# Patient Record
Sex: FEMALE | Race: ASIAN | NOT HISPANIC OR LATINO | URBAN - METROPOLITAN AREA
[De-identification: names, ages, dates, MRNs, and addresses within clinical notes are randomized per-mention and may not be internally consistent; named-entity substitution may affect disease eponyms.]

---

## 2017-03-06 ENCOUNTER — ALLSCRIPTS OFFICE VISIT (OUTPATIENT)
Dept: OTHER | Facility: OTHER | Age: 37
End: 2017-03-06

## 2017-03-06 DIAGNOSIS — Z13.220 ENCOUNTER FOR SCREENING FOR LIPOID DISORDERS: ICD-10-CM

## 2017-03-06 DIAGNOSIS — Z13.1 ENCOUNTER FOR SCREENING FOR DIABETES MELLITUS: ICD-10-CM

## 2017-03-06 DIAGNOSIS — Z13.6 ENCOUNTER FOR SCREENING FOR CARDIOVASCULAR DISORDERS: ICD-10-CM

## 2017-03-06 DIAGNOSIS — Z00.00 ENCOUNTER FOR GENERAL ADULT MEDICAL EXAMINATION WITHOUT ABNORMAL FINDINGS: ICD-10-CM

## 2017-03-07 ENCOUNTER — LAB CONVERSION - ENCOUNTER (OUTPATIENT)
Dept: OTHER | Facility: OTHER | Age: 37
End: 2017-03-07

## 2017-03-07 LAB
A/G RATIO (HISTORICAL): 1.6 (CALC) (ref 1–2.5)
ALBUMIN SERPL BCP-MCNC: 4.4 G/DL (ref 3.6–5.1)
ALP SERPL-CCNC: 40 U/L (ref 33–115)
ALT SERPL W P-5'-P-CCNC: 8 U/L (ref 6–29)
AST SERPL W P-5'-P-CCNC: 15 U/L (ref 10–30)
BILIRUB SERPL-MCNC: 0.8 MG/DL (ref 0.2–1.2)
BUN SERPL-MCNC: 13 MG/DL (ref 7–25)
BUN/CREA RATIO (HISTORICAL): NORMAL (CALC) (ref 6–22)
CALCIUM SERPL-MCNC: 9.5 MG/DL (ref 8.6–10.2)
CHLORIDE SERPL-SCNC: 105 MMOL/L (ref 98–110)
CHOLEST SERPL-MCNC: 224 MG/DL (ref 125–200)
CHOLEST/HDLC SERPL: 4 (CALC)
CO2 SERPL-SCNC: 26 MMOL/L (ref 20–31)
CREAT SERPL-MCNC: 0.7 MG/DL (ref 0.5–1.1)
EGFR AFRICAN AMERICAN (HISTORICAL): 127 ML/MIN/1.73M2
EGFR-AMERICAN CALC (HISTORICAL): 110 ML/MIN/1.73M2
GAMMA GLOBULIN (HISTORICAL): 2.7 G/DL (CALC) (ref 1.9–3.7)
GLUCOSE (HISTORICAL): 85 MG/DL (ref 65–99)
HDLC SERPL-MCNC: 56 MG/DL
LDL CHOLESTEROL (HISTORICAL): 153 MG/DL (CALC)
NON-HDL-CHOL (CHOL-HDL) (HISTORICAL): 168 MG/DL (CALC)
POTASSIUM SERPL-SCNC: 4.4 MMOL/L (ref 3.5–5.3)
SODIUM SERPL-SCNC: 138 MMOL/L (ref 135–146)
TOTAL PROTEIN (HISTORICAL): 7.1 G/DL (ref 6.1–8.1)
TRIGL SERPL-MCNC: 75 MG/DL

## 2017-03-08 ENCOUNTER — GENERIC CONVERSION - ENCOUNTER (OUTPATIENT)
Dept: OTHER | Facility: OTHER | Age: 37
End: 2017-03-08

## 2017-03-23 ENCOUNTER — GENERIC CONVERSION - ENCOUNTER (OUTPATIENT)
Dept: OTHER | Facility: OTHER | Age: 37
End: 2017-03-23

## 2017-07-31 ENCOUNTER — ALLSCRIPTS OFFICE VISIT (OUTPATIENT)
Dept: OTHER | Facility: OTHER | Age: 37
End: 2017-07-31

## 2017-07-31 DIAGNOSIS — Z12.4 ENCOUNTER FOR SCREENING FOR MALIGNANT NEOPLASM OF CERVIX: ICD-10-CM

## 2017-07-31 PROCEDURE — 88175 CYTOPATH C/V AUTO FLUID REDO: CPT | Performed by: FAMILY MEDICINE

## 2017-07-31 PROCEDURE — 87624 HPV HI-RISK TYP POOLED RSLT: CPT | Performed by: FAMILY MEDICINE

## 2017-08-01 ENCOUNTER — LAB REQUISITION (OUTPATIENT)
Dept: LAB | Facility: HOSPITAL | Age: 37
End: 2017-08-01
Payer: COMMERCIAL

## 2017-08-01 DIAGNOSIS — Z12.4 ENCOUNTER FOR SCREENING FOR MALIGNANT NEOPLASM OF CERVIX: ICD-10-CM

## 2017-08-03 LAB — HPV RRNA GENITAL QL NAA+PROBE: NORMAL

## 2017-08-05 LAB
LAB AP GYN PRIMARY INTERPRETATION: NORMAL
LAB AP LMP: NORMAL
Lab: NORMAL

## 2017-08-08 ENCOUNTER — GENERIC CONVERSION - ENCOUNTER (OUTPATIENT)
Dept: OTHER | Facility: OTHER | Age: 37
End: 2017-08-08

## 2018-01-09 NOTE — PROGRESS NOTES
Assessment    1  Encounter for preventive health examination (V70 0) (Z00 00)   2  Encounter for Papanicolaou smear for cervical cancer screening (V76 2) (Z12 4)    Plan  Encounter for Papanicolaou smear for cervical cancer screening    · (1) THIN PREP PAP FOLLOW UP WITH IMAGING; Status:Active; Requested  for:20Obq4125; Maturation index required? : No  : 7/8/2017  HPV? : Regardless of Interpretation   · Always use a seat belt and shoulder strap when riding or driving a motor vehicle ;  Status:Complete;   Done: 30VVG8323   · Begin a limited exercise program ; Status:Complete;   Done: 92NIE5391   · Brush your teeth 3 times a day and floss at least once a day ; Status:Complete;   Done:  87SDH5973   · Eat a normal well-balanced diet ; Status:Complete;   Done: 94QPU5306   · Use a sun block product with an SPF of 15 or more ; Status:Complete;   Done: 76OHR7906   · We recommend routine visits to a dentist ; Status:Complete;   Done: 58ASW2495    Discussion/Summary  health maintenance visit Currently, she eats a healthy diet  HPV and Pap Co-testing Done Today Breast cancer screening: breast cancer screening is not indicated  Colorectal cancer screening: colorectal cancer screening is not indicated  Osteoporosis screening: bone mineral density testing is not indicated  1) HM - Pap done today  Reviewed screening lab results form lab visit with patient  Encouraged patient to see a dentist   F/U yearly  Self Referrals: No      History of Present Illness  HM, Adult Female: The patient is being seen for a health maintenance evaluation  The last health maintenance visit was year(s) ago, 3  General Health: The patient's health since the last visit is described as good  She does not have regular dental visits  She denies vision problems  She denies hearing loss  Lifestyle:  She consumes a diverse and healthy diet  She does not have any weight concerns  She exercises regularly   She does not use tobacco  She denies alcohol use  She denies drug use  Reproductive health: the patient is premenopausal    Screening: cancer screening reviewed and updated  metabolic screening reviewed and current  HPI: LMP 2017    No problem with sexual activity  Has not seen dentist      Review of Systems    Constitutional: No fever, no chills, feels well, no tiredness, no recent weight gain or weight loss  Eyes: No complaints of eye pain, no red eyes, no eyesight problems, no discharge, no dry eyes, no itching of eyes  ENT: no complaints of earache, no loss of hearing, no nose bleeds, no nasal discharge, no sore throat, no hoarseness  Cardiovascular: No complaints of slow heart rate, no fast heart rate, no chest pain, no palpitations, no leg claudication, no lower extremity edema  Respiratory: No complaints of shortness of breath, no wheezing, no cough, no SOB on exertion, no orthopnea, no PND  Gastrointestinal: No complaints of abdominal pain, no constipation, no nausea or vomiting, no diarrhea, no bloody stools  Musculoskeletal: No complaints of arthralgias, no myalgias, no joint swelling or stiffness, no limb pain or swelling  Integumentary: No complaints of skin rash or lesions, no itching, no skin wounds, no breast pain or lump  Neurological: No complaints of headache, no confusion, no convulsions, no numbness, no dizziness or fainting, no tingling, no limb weakness, no difficulty walking  Psychiatric: Not suicidal, no sleep disturbance, no anxiety or depression, no change in personality, no emotional problems  Endocrine: No complaints of proptosis, no hot flashes, no muscle weakness, no deepening of the voice, no feelings of weakness  Hematologic/Lymphatic: No complaints of swollen glands, no swollen glands in the neck, does not bleed easily, does not bruise easily  Active Problems    1  Allergic conjunctivitis of both eyes (372 14) (H10 13)   2  Chronic sinusitis (473 9) (J32 9)   3   Encounter for screening for cardiovascular disorders (V81 2) (Z13 6)   4  Screening for diabetes mellitus (DM) (V77 1) (Z13 1)   5  Screening for lipoid disorders (V77 91) (Z13 220)    Family History  Father    · Family history of hypertension (V17 49) (Z82 49)    Social History    · Never a smoker   · No alcohol use    Current Meds   1  No Reported Medications Recorded    Allergies    1  No Known Drug Allergies    Vitals   Recorded: 45DGZ4025 11:00AM   Temperature 96 7 F   Heart Rate 72   Respiration 16   Systolic 100   Diastolic 70   Weight 614 lb 8 oz   BMI Calculated 26 35   BSA Calculated 1 75     Physical Exam    Constitutional   General appearance: No acute distress, well appearing and well nourished  Head and Face   Head and face: Normal     Eyes   Conjunctiva and lids: No swelling, erythema or discharge  Ears, Nose, Mouth, and Throat   External inspection of ears and nose: Normal     Otoscopic examination: Tympanic membranes translucent with normal light reflex  Canals patent without erythema  Nasal mucosa, septum, and turbinates: Normal without edema or erythema  Lips, teeth, and gums: Normal, good dentition  Neck   Neck: Supple, symmetric, trachea midline, no masses  Thyroid: Normal, no thyromegaly  Pulmonary   Respiratory effort: No increased work of breathing or signs of respiratory distress  Auscultation of lungs: Clear to auscultation  Cardiovascular   Auscultation of heart: Normal rate and rhythm, normal S1 and S2, no murmurs  Examination of extremities for edema and/or varicosities: Normal     Abdomen   Abdomen: Non-tender, no masses  Liver and spleen: No hepatomegaly or splenomegaly  Genitourinary   External genitalia and vagina: Normal, no lesions appreciated  Urethra: Normal, no discharge  Bladder: Not distended, no tenderness  Cervix: Normal, no lesions, Pap obtained  Uterus: Normal size, no tenderness, no masses  transformation zone seen     Adnexa/Parametria: Normal, no masses or tenderness  Lymphatic   Palpation of lymph nodes in neck: No lymphadenopathy  Palpation of lymph nodes in axillae: No lymphadenopathy  Musculoskeletal   Gait and station: Normal     Digits and nails: Normal without clubbing or cyanosis  Joints, bones, and muscles: Normal     Range of motion: Normal     Stability: Normal     Muscle strength/tone: Normal     Skin   Skin and subcutaneous tissue: Normal without rashes or lesions  Palpation of skin and subcutaneous tissue: Normal turgor  Psychiatric   Judgment and insight: Normal     Orientation to person, place, and time: Normal     Recent and remote memory: Intact      Mood and affect: Normal        Signatures   Electronically signed by : KIRTI Leyva Cea ; Aug  1 2017 10:36AM EST                       (Author)

## 2018-01-14 VITALS
HEART RATE: 72 BPM | WEIGHT: 154 LBS | SYSTOLIC BLOOD PRESSURE: 122 MMHG | DIASTOLIC BLOOD PRESSURE: 78 MMHG | TEMPERATURE: 97.8 F | BODY MASS INDEX: 26.29 KG/M2 | RESPIRATION RATE: 16 BRPM | HEIGHT: 64 IN

## 2018-01-14 VITALS
SYSTOLIC BLOOD PRESSURE: 110 MMHG | DIASTOLIC BLOOD PRESSURE: 70 MMHG | HEART RATE: 72 BPM | TEMPERATURE: 96.7 F | WEIGHT: 153.5 LBS | RESPIRATION RATE: 16 BRPM

## 2018-01-16 NOTE — RESULT NOTES
Message  Lamar Lopez tovastan ortizam kiarra janice mirella em [facility] Jim[facility] em can pritchett pap smear la nam 2022  Your pap smear result is normal  Next pap is due in 2022      Dr Koko Torres   Electronically signed by : KIRTI Wilburn ; Aug  8 2017  9:02AM EST                       (Author)

## 2018-01-16 NOTE — RESULT NOTES
Message  Lamar Padillastan villalpandou wesly mirella em dawit doi tot  Chi co chat mo (cholesterol) wei joe can [employer]  Em rang kieng an nhieu chat mo va tap the mike sal ngay  Your lab work is pretty good  The cholesterol is slightly high but no need for medication yet  Please try to stay on low fat diet and maintain daily exercise      Dr Samantha Morales   Electronically signed by : Elwyn Denver, M D ; Mar  8 2017  9:37AM EST                       (Author)

## 2021-03-04 ENCOUNTER — OFFICE VISIT (OUTPATIENT)
Dept: FAMILY MEDICINE CLINIC | Facility: CLINIC | Age: 41
End: 2021-03-04
Payer: COMMERCIAL

## 2021-03-04 VITALS
DIASTOLIC BLOOD PRESSURE: 64 MMHG | HEART RATE: 71 BPM | BODY MASS INDEX: 27.46 KG/M2 | WEIGHT: 164.8 LBS | OXYGEN SATURATION: 99 % | HEIGHT: 65 IN | TEMPERATURE: 95.4 F | SYSTOLIC BLOOD PRESSURE: 102 MMHG

## 2021-03-04 DIAGNOSIS — E78.5 DYSLIPIDEMIA: ICD-10-CM

## 2021-03-04 DIAGNOSIS — Z11.4 SCREENING FOR HIV (HUMAN IMMUNODEFICIENCY VIRUS): ICD-10-CM

## 2021-03-04 DIAGNOSIS — E53.8 FOLIC ACID DEFICIENCY: ICD-10-CM

## 2021-03-04 DIAGNOSIS — H57.13 PAIN OF BOTH EYES: ICD-10-CM

## 2021-03-04 DIAGNOSIS — Z28.39 IMMUNIZATION DEFICIENCY: ICD-10-CM

## 2021-03-04 DIAGNOSIS — Z00.00 ANNUAL PHYSICAL EXAM: ICD-10-CM

## 2021-03-04 DIAGNOSIS — E54 ASCORBIC ACID DEFICIENCY: ICD-10-CM

## 2021-03-04 DIAGNOSIS — R63.8 INCREASED BMI: ICD-10-CM

## 2021-03-04 DIAGNOSIS — E55.9 VITAMIN D DEFICIENCY: ICD-10-CM

## 2021-03-04 DIAGNOSIS — K92.1 BLOOD IN STOOL: ICD-10-CM

## 2021-03-04 DIAGNOSIS — Z12.31 ENCOUNTER FOR SCREENING MAMMOGRAM FOR BREAST CANCER: Primary | ICD-10-CM

## 2021-03-04 DIAGNOSIS — E53.8 CYANOCOBALAMIN DEFICIENCY: ICD-10-CM

## 2021-03-04 DIAGNOSIS — G43.B1 INTRACTABLE OPHTHALMOPLEGIC MIGRAINE: ICD-10-CM

## 2021-03-04 PROCEDURE — 99396 PREV VISIT EST AGE 40-64: CPT | Performed by: FAMILY MEDICINE

## 2021-03-04 RX ORDER — POLYETHYLENE GLYCOL 3350 17 G/17G
17 POWDER, FOR SOLUTION ORAL 2 TIMES DAILY
Qty: 507 G | Refills: 2 | Status: SHIPPED | OUTPATIENT
Start: 2021-03-04

## 2021-03-04 NOTE — PROGRESS NOTES
ADULT ANNUAL PHYSICAL  [address] [facility]    NAME: Kari Aragon  AGE: 41 y o  SEX: female  : 1980     DATE: 3/4/2021     Assessment and Plan:     Order for mammogram   Pap smear will be done next year  She is not sexually active  Offer flu vaccine patient refused  Regarding headaches will refer patient to neurologist   She try cervical injection that did not help  She try topiramate it did not help either  She had ocular migraine  Refer patient to Ophthalmology for evaluation for eyes pain  Will need blood work  Tdap was done  she will need in 2 years  Will check blood work  Will follow-up cholesterol since it was high in the past   She complained of blood in the stool due to constipation will try stool softener if is not improved will recommend colonoscopy        Problem List Items Addressed This Visit        Cardiovascular and Mediastinum    Intractable ophthalmoplegic migraine    Relevant Orders    Ambulatory referral to Neurology       Other    Pain of both eyes    Relevant Orders    Ambulatory referral to Ophthalmology    Blood in stool    Relevant Medications    polyethylene glycol (GLYCOLAX) 17 GM/SCOOP powder    Other Relevant Orders    CBC and differential    UA w Reflex to Microscopic w Reflex to Culture    Occult Blood, Fecal Immunochemical    Ambulatory referral to General Surgery    Folic acid deficiency    Dyslipidemia    Relevant Orders    Comprehensive metabolic panel    Lipid Panel with Direct LDL reflex    TSH, 3rd generation with Free T4 reflex      Other Visit Diagnoses     Encounter for screening mammogram for breast cancer    -  Primary    Relevant Orders    Mammo screening bilateral w 3d & cad    Screening for HIV (human immunodeficiency virus)        Relevant Orders    HIV 1/2 Antigen/Antibody (4th Generation) w Reflex [facility]    Increased BMI        Vitamin D deficiency        Relevant Orders    Vitamin D 25 hydroxy Cyanocobalamin deficiency        Relevant Orders    Vitamin B12    Immunization deficiency        Relevant Orders    Hepatitis A antibody, total    Ascorbic acid deficiency        Relevant Orders    Vitamin C    Annual physical exam              Immunizations and preventive care screenings were discussed with patient today  Appropriate education was printed on patient's after visit summary  Counseling:  · Exercise: the importance of regular exercise/physical activity was discussed  Recommend exercise 3-5 times per week for at least 30 minutes  BMI Counseling: Body mass index is 27 42 kg/m²  The BMI is above normal  Nutrition recommendations include decreasing portion sizes, encouraging healthy choices of fruits and vegetables, limiting drinks that contain sugar and reducing intake of cholesterol  Exercise recommendations include exercising 3-5 times per week  No pharmacotherapy was ordered  Return in about 3 months (around 6/4/2021) for 30 min f/u blood test/blood in stool  Chief Complaint:     Chief Complaint   Patient presents with    Follow-up      History of Present Illness:     Adult Annual Physical   Patient here for a comprehensive physical exam  The patient reports problems - headaches, eyes pain, blurry vision  Diet and Physical Activity  · Diet/Nutrition: well balanced diet  · Exercise: no formal exercise  Depression Screening  PHQ-9 Depression Screening    PHQ-9:   Frequency of the following problems over the past two weeks:      Little interest or pleasure in doing things: 0 - not at all  Feeling down, depressed, or hopeless: 0 - not at all  PHQ-2 Score: 0       General Health  · Sleep: sleeps well  · Hearing: normal - bilateral   · Vision: no vision problems and vision problems: yes eyes pain  · Dental: regular dental visits  /GYN Health  · Patient is: perimenopausal  · Last menstrual period: 3 weeks ago  · Contraceptive method: none       Review of Systems: Review of Systems   Constitutional: Negative for activity change, appetite change, fatigue and unexpected weight change  HENT: Negative for ear pain, sore throat, trouble swallowing and voice change  Eyes: Positive for photophobia, pain and visual disturbance  Respiratory: Negative for cough, chest tightness, shortness of breath and wheezing  Cardiovascular: Negative for chest pain, palpitations and leg swelling  Gastrointestinal: Negative for abdominal pain, constipation, diarrhea, nausea, rectal pain and vomiting  Endocrine: Negative for cold intolerance, polydipsia and polyuria  Genitourinary: Negative for difficulty urinating, dysuria, flank pain, menstrual problem and pelvic pain  Musculoskeletal: Negative for arthralgias, joint swelling and myalgias  Skin: Negative for color change and rash  Allergic/Immunologic: Negative for environmental allergies and immunocompromised state  Neurological: Positive for headaches  Negative for dizziness, weakness and numbness  Hematological: Negative for adenopathy  Does not bruise/bleed easily  Psychiatric/Behavioral: Negative for decreased concentration, dysphoric mood, self-injury, sleep disturbance and suicidal ideas  The patient is not nervous/anxious  Past Medical History:     Past Medical History:   Diagnosis Date    Blood in stool 3/4/2021    Dyslipidemia 6/6/5393    Folic acid deficiency 2/0/2836    Intractable ophthalmoplegic migraine 3/4/2021    Migraines     Pain of both eyes 3/4/2021      Past Surgical History:     History reviewed  No pertinent surgical history     Social History:        Social History     Socioeconomic History    Marital status: /Civil Union     Spouse name: None    Number of children: None    Years of education: 15    Highest education level: None   Occupational History    Occupation:    works at [employer] [address] [employer] [address] [place] strain: None    Food insecurity     Worry: None     Inability: None    Transportation needs     Medical: None     Non-medical: None   Tobacco Use    Smoking status: Never Smoker    Smokeless tobacco: Never Used   Substance and Sexual Activity    Alcohol use: Not Currently     Comment: Alcohol intake:   None - As per Agnes Ac Drug use: None    Sexual activity: None   Lifestyle    Physical activity     Days per week: None     Minutes per session: None    Stress: None   Relationships    Social connections     Talks on phone: None     Gets together: None     Attends Shinto service: None     Active member of club or organization: None     Attends meetings of clubs or organizations: None     Relationship status: None    Intimate partner violence     Fear of current or ex partner: None     Emotionally abused: None     Physically abused: None     Forced sexual activity: None   Other Topics Concern    None   Social History Narrative    · Most recent tobacco use screenin2019      · Do you currently or have you served in the [employer]:   No      · Were you activated, into active duty, as a member of the [employer] or as a Reservist:   No      · Marital status:       · Exercise level:   Occasional      · Diet:   Regular      · General stress level:   Low      · Caffeine intake:   Occasional      · Guns present in home:   No      · Seat belts used routinely:   Yes      · Sunscreen used routinely:   Yes     · Live alone or with others:   with others      · Smoke alarm in home: Yes      · Are there stairs in your home:    Yes      · Advance directive:   No      · International travel:   yes      · Pets:   No     As per [employer]       Family History:     Family History   Problem Relation Age of Onset    Diabetes Mother     Migraines Mother     Hypertension Father     Hyperlipidemia Father       Current Medications:     Current Outpatient Medications   Medication Sig Dispense Refill    polyethylene glycol (GLYCOLAX) 17 GM/SCOOP powder Take 17 g by mouth 2 (two) times a day 507 g 2     No current facility-administered medications for this visit  Allergies:     No Known Allergies   Physical Exam:     /64 (BP Location: Left arm, Patient Position: Sitting, Cuff Size: Standard)   Pulse 71   Temp (!) 95 4 °F (35 2 °C) (Tympanic)   Ht 5' 5" (1 651 m)   Wt 74 8 kg (164 lb 12 8 oz)   SpO2 99%   BMI 27 42 kg/m²     Physical Exam  Vitals signs and nursing note reviewed  Constitutional:       General: She is not in acute distress  Appearance: Normal appearance  She is well-developed  HENT:      Head: Normocephalic and atraumatic  Right Ear: Tympanic membrane normal       Left Ear: Tympanic membrane normal       Nose: Nose normal       Mouth/Throat:      Mouth: Mucous membranes are moist    Eyes:      Conjunctiva/sclera: Conjunctivae normal    Neck:      Musculoskeletal: Normal range of motion and neck supple  Cardiovascular:      Rate and Rhythm: Normal rate and regular rhythm  Pulses: Normal pulses  Heart sounds: No murmur  Pulmonary:      Effort: Pulmonary effort is normal  No respiratory distress  Breath sounds: Normal breath sounds  Abdominal:      Palpations: Abdomen is soft  Tenderness: There is no abdominal tenderness  Musculoskeletal: Normal range of motion  Skin:     General: Skin is warm and dry  Capillary Refill: Capillary refill takes less than 2 seconds  Neurological:      General: No focal deficit present  Mental Status: She is alert and oriented to person, place, and time  Mental status is at baseline  Psychiatric:         Mood and Affect: Mood normal          Behavior: Behavior normal          Thought Content:  Thought content normal          Judgment: Judgment normal           Armando Oh MD  [facility]

## 2021-03-04 NOTE — PATIENT INSTRUCTIONS

## 2021-03-09 ENCOUNTER — TELEPHONE (OUTPATIENT)
Dept: NEUROLOGY | Facility: CLINIC | Age: 41
End: 2021-03-09

## 2021-03-09 NOTE — TELEPHONE ENCOUNTER
Best contact number for [facility]NNMD[facility]:[phone]    Emergency Contact name and number:None     Referring provider and telephone number:Armando Oh    Primary Care Provider Name and if affiliated with [facility]: Armando Oh      Reason for Appointment/Dx:Migraines     Have you seen and followed up with a pediatric Neurologist for this disease in the past?  No    Neurology Location patient would like to be seen:[place]    Order received? Yes                                                 Records Received? No    Have you ever seen another Neurologist?       No    Insurance Information    Insurance Name:[employer] [facility]     ID/Policy #:    Secondary Insurance:    ID/Policy#:       Workman's Comp/ Accident/ School  Information      Workman's Comp/Accident/School related?       none    If yes name of Insurance company:    Date of Injury:    Type of Injury:    [address] Name and Telephone Number:    Notes:Appointment schedule with patient new patient pack sent                    Appointment date: 08-05-21 2:00pm with Nisha Ellsworth

## 2021-03-12 LAB
25(OH)D3 SERPL-MCNC: 32 NG/ML (ref 30–100)
ALBUMIN SERPL-MCNC: 4.5 G/DL (ref 3.6–5.1)
ALBUMIN/GLOB SERPL: 1.5 (CALC) (ref 1–2.5)
ALP SERPL-CCNC: 42 U/L (ref 31–125)
ALT SERPL-CCNC: 12 U/L (ref 6–29)
APPEARANCE UR: CLEAR
AST SERPL-CCNC: 16 U/L (ref 10–30)
BACTERIA UR QL AUTO: NORMAL /HPF
BASOPHILS # BLD AUTO: 43 CELLS/UL (ref 0–200)
BASOPHILS NFR BLD AUTO: 0.6 %
BILIRUB SERPL-MCNC: 0.6 MG/DL (ref 0.2–1.2)
BILIRUB UR QL STRIP: NEGATIVE
BUN SERPL-MCNC: 14 MG/DL (ref 7–25)
BUN/CREAT SERPL: NORMAL (CALC) (ref 6–22)
CALCIUM SERPL-MCNC: 9.5 MG/DL (ref 8.6–10.2)
CHLORIDE SERPL-SCNC: 105 MMOL/L (ref 98–110)
CHOLEST SERPL-MCNC: 248 MG/DL
CHOLEST/HDLC SERPL: 4.5 (CALC)
CO2 SERPL-SCNC: 27 MMOL/L (ref 20–32)
COLOR UR: YELLOW
CREAT SERPL-MCNC: 0.56 MG/DL (ref 0.5–1.1)
EOSINOPHIL # BLD AUTO: 78 CELLS/UL (ref 15–500)
EOSINOPHIL NFR BLD AUTO: 1.1 %
ERYTHROCYTE [DISTWIDTH] IN BLOOD BY AUTOMATED COUNT: 12.7 % (ref 11–15)
GLOBULIN SER CALC-MCNC: 3.1 G/DL (CALC) (ref 1.9–3.7)
GLUCOSE SERPL-MCNC: 94 MG/DL (ref 65–99)
GLUCOSE UR QL STRIP: NEGATIVE
HAV AB SER QL IA: REACTIVE
HCT VFR BLD AUTO: 40.4 % (ref 35–45)
HDLC SERPL-MCNC: 55 MG/DL
HGB BLD-MCNC: 13.5 G/DL (ref 11.7–15.5)
HGB UR QL STRIP: NEGATIVE
HIV 1+2 AB+HIV1 P24 AG SERPL QL IA: NORMAL
HYALINE CASTS #/AREA URNS LPF: NORMAL /LPF
KETONES UR QL STRIP: NEGATIVE
LDLC SERPL CALC-MCNC: 170 MG/DL (CALC)
LEUKOCYTE ESTERASE UR QL STRIP: NEGATIVE
LYMPHOCYTES # BLD AUTO: 2222 CELLS/UL (ref 850–3900)
LYMPHOCYTES NFR BLD AUTO: 31.3 %
MCH RBC QN AUTO: 29.9 PG (ref 27–33)
MCHC RBC AUTO-ENTMCNC: 33.4 G/DL (ref 32–36)
MCV RBC AUTO: 89.6 FL (ref 80–100)
MONOCYTES # BLD AUTO: 355 CELLS/UL (ref 200–950)
MONOCYTES NFR BLD AUTO: 5 %
NEUTROPHILS # BLD AUTO: 4402 CELLS/UL (ref 1500–7800)
NEUTROPHILS NFR BLD AUTO: 62 %
NITRITE UR QL STRIP: NEGATIVE
NONHDLC SERPL-MCNC: 193 MG/DL (CALC)
PH UR STRIP: 6 [PH] (ref 5–8)
PLATELET # BLD AUTO: 336 THOUSAND/UL (ref 140–400)
PMV BLD REES-ECKER: 10.5 FL (ref 7.5–12.5)
POTASSIUM SERPL-SCNC: 4.2 MMOL/L (ref 3.5–5.3)
PROT SERPL-MCNC: 7.6 G/DL (ref 6.1–8.1)
PROT UR QL STRIP: NEGATIVE
RBC # BLD AUTO: 4.51 MILLION/UL (ref 3.8–5.1)
RBC #/AREA URNS HPF: NORMAL /HPF
SL AMB EGFR AFRICAN AMERICAN: 133 ML/MIN/1.73M2
SL AMB EGFR NON AFRICAN AMERICAN: 115 ML/MIN/1.73M2
SODIUM SERPL-SCNC: 137 MMOL/L (ref 135–146)
SP GR UR STRIP: 1.02 (ref 1–1.03)
SQUAMOUS #/AREA URNS HPF: NORMAL /HPF
TRIGL SERPL-MCNC: 108 MG/DL
TSH SERPL-ACNC: 1.97 MIU/L
VIT B12 SERPL-MCNC: 489 PG/ML (ref 200–1100)
VIT C SERPL-MCNC: 1.4 MG/DL (ref 0.3–2.7)
WBC # BLD AUTO: 7.1 THOUSAND/UL (ref 3.8–10.8)
WBC #/AREA URNS HPF: NORMAL /HPF

## 2021-04-06 DIAGNOSIS — Z23 ENCOUNTER FOR IMMUNIZATION: ICD-10-CM

## 2021-04-30 ENCOUNTER — IMMUNIZATIONS (OUTPATIENT)
Dept: FAMILY MEDICINE CLINIC | Facility: HOSPITAL | Age: 41
End: 2021-04-30

## 2021-04-30 DIAGNOSIS — Z23 ENCOUNTER FOR IMMUNIZATION: Primary | ICD-10-CM

## 2021-04-30 PROCEDURE — 0001A SARS-COV-2 / COVID-19 MRNA VACCINE (PFIZER-BIONTECH) 30 MCG: CPT

## 2021-04-30 PROCEDURE — 91300 SARS-COV-2 / COVID-19 MRNA VACCINE (PFIZER-BIONTECH) 30 MCG: CPT

## 2021-05-23 ENCOUNTER — IMMUNIZATIONS (OUTPATIENT)
Dept: FAMILY MEDICINE CLINIC | Facility: HOSPITAL | Age: 41
End: 2021-05-23

## 2021-05-23 DIAGNOSIS — Z23 ENCOUNTER FOR IMMUNIZATION: Primary | ICD-10-CM

## 2021-05-23 PROCEDURE — 91300 SARS-COV-2 / COVID-19 MRNA VACCINE (PFIZER-BIONTECH) 30 MCG: CPT

## 2021-05-23 PROCEDURE — 0002A SARS-COV-2 / COVID-19 MRNA VACCINE (PFIZER-BIONTECH) 30 MCG: CPT

## 2021-06-30 ENCOUNTER — TELEPHONE (OUTPATIENT)
Dept: NEUROLOGY | Facility: CLINIC | Age: 41
End: 2021-06-30

## 2021-06-30 NOTE — TELEPHONE ENCOUNTER
THE [facility] for pt to call to resched OV on 8/5/2021 due to provider not being in office

## 2021-07-13 ENCOUNTER — TELEPHONE (OUTPATIENT)
Dept: NEUROLOGY | Facility: CLINIC | Age: 41
End: 2021-07-13

## 2021-07-13 NOTE — TELEPHONE ENCOUNTER
THE [facility] for pt to call to R/S OV of 8/5/2021 @ 2pm  (Dr Natalia Mims not in office that day)

## 2021-07-20 ENCOUNTER — TELEPHONE (OUTPATIENT)
Dept: NEUROLOGY | Facility: CLINIC | Age: 41
End: 2021-07-20

## 2021-07-20 NOTE — TELEPHONE ENCOUNTER
3rd attempt:  THE [facility] need to resched appt due to provider not in office that day  Gave central scheduling number

## 2021-07-20 NOTE — LETTER
Robby Tovar  [address] [facility] [address]     Dear Ms Alex:      Our office has been unsuccessful in trying to reach you by phone regarding:      ? Office appointment         Please contact our office at your earliest convenience  Please call [phone]  and follow the prompts        Sincerely,      [facility]

## 2022-01-12 ENCOUNTER — RA CDI HCC (OUTPATIENT)
Dept: OTHER | Facility: HOSPITAL | Age: 42
End: 2022-01-12

## 2022-01-12 NOTE — PROGRESS NOTES
[facility]  coding opportunities       Chart reviewed, no opportunity found: CHART REVIEWED, NO OPPORTUNITY FOUND                        Patients insurance company: [employer] (Medicare Advantage and Commercial)

## 2022-01-13 ENCOUNTER — OFFICE VISIT (OUTPATIENT)
Dept: FAMILY MEDICINE CLINIC | Facility: CLINIC | Age: 42
End: 2022-01-13
Payer: COMMERCIAL

## 2022-01-13 VITALS
HEIGHT: 65 IN | WEIGHT: 161 LBS | SYSTOLIC BLOOD PRESSURE: 116 MMHG | TEMPERATURE: 96.4 F | OXYGEN SATURATION: 98 % | BODY MASS INDEX: 26.82 KG/M2 | HEART RATE: 76 BPM | DIASTOLIC BLOOD PRESSURE: 80 MMHG

## 2022-01-13 DIAGNOSIS — E53.1 PYRIDOXINE DEFICIENCY: ICD-10-CM

## 2022-01-13 DIAGNOSIS — E51.9 THIAMINE DEFICIENCY: ICD-10-CM

## 2022-01-13 DIAGNOSIS — G43.B1 INTRACTABLE OPHTHALMOPLEGIC MIGRAINE: ICD-10-CM

## 2022-01-13 DIAGNOSIS — E78.5 DYSLIPIDEMIA: Primary | ICD-10-CM

## 2022-01-13 DIAGNOSIS — E55.9 VITAMIN D DEFICIENCY: ICD-10-CM

## 2022-01-13 DIAGNOSIS — E53.8 CYANOCOBALAMIN DEFICIENCY: ICD-10-CM

## 2022-01-13 PROCEDURE — 99214 OFFICE O/P EST MOD 30 MIN: CPT | Performed by: FAMILY MEDICINE

## 2022-01-13 RX ORDER — SUMATRIPTAN 50 MG/1
50 TABLET, FILM COATED ORAL ONCE AS NEEDED
Qty: 12 TABLET | Refills: 3 | Status: SHIPPED | OUTPATIENT
Start: 2022-01-13 | End: 2022-02-03

## 2022-01-13 RX ORDER — AMITRIPTYLINE HYDROCHLORIDE 10 MG/1
10 TABLET, FILM COATED ORAL
Qty: 30 TABLET | Refills: 3 | Status: SHIPPED | OUTPATIENT
Start: 2022-01-13 | End: 2022-02-03 | Stop reason: SDUPTHER

## 2022-01-13 NOTE — PROGRESS NOTES
BMI Counseling: Body mass index is 26 79 kg/m²  The BMI is above normal  Nutrition recommendations include decreasing portion sizes, encouraging healthy choices of fruits and vegetables, limiting drinks that contain sugar and reducing intake of cholesterol  Exercise recommendations include exercising 3-5 times per week  No pharmacotherapy was ordered  Rationale for BMI follow-up plan is due to patient being overweight or obese  Assessment/Plan:    Will start amitriptyline for her to help with cervicalgia and prevent migraine headache advised to use lidocaine patch to help with her neck and her shoulder  Imitrex as needed for abortive agent  She has an appointment with neurologist next month  Discussed blood test results patient detail LDL is high 170 this is new for her  Advised diet exercise weight loss will recheck in 6 months is still the same recommendation to start lowering lipid agent  Advised to check insurance for HPV vaccine coverage  Patient already had flu and COVID vaccine  B12 vitamin-D are low range recommendation for supplementation  I have spent 30 minutes with Patient  today in which greater than 50% of this time was spent in counseling/coordination of care regarding Diagnostic results, Prognosis, Risks and benefits of tx options, Intructions for management, Importance of tx compliance and Risk factor reductions             Problem List Items Addressed This Visit        Cardiovascular and Mediastinum    Intractable ophthalmoplegic migraine    Relevant Medications    amitriptyline (ELAVIL) 10 mg tablet    SUMAtriptan (Imitrex) 50 mg tablet       Other    Dyslipidemia - Primary    Relevant Orders    Comprehensive metabolic panel    Lipid Panel with Direct LDL reflex    Cyanocobalamin deficiency    Relevant Orders    CBC and differential    Vitamin B12    Vitamin D deficiency    Relevant Orders    Vitamin D 25 hydroxy      Other Visit Diagnoses     Thiamine deficiency        Relevant Orders    Vitamin B1 (Thiamine), Serum/Plasma, LC/MS/MS    Pyridoxine deficiency        Relevant Orders    Vitamin B6    BMI 26 0-26 9,adult                Subjective:      Patient ID: Storm Gaona is a 35 y o  female  41-year-old female presenting follow-up blood work and headaches  She lost her insurance so she could no longer so follow-up with neurologist   She has noticed headaches are worsening start with her neck than rate up to her head she has take Excedrin migraine that the only medication that helped her  She works as a  bending a lot looking down a lot  She noticed stiffness in her neck and stiffness in her upper back  She does have eye pain and follow-up with ophthalmologist for eye drops  She did not get mammogram nor follow-up blood work  She is up-to-date COVID vaccine and flu vaccine  Headache   Pertinent negatives include no abdominal pain, coughing, dizziness, ear pain, nausea, numbness, photophobia, sore throat, vomiting or weakness  The following portions of the patient's history were reviewed and updated as appropriate:   Past Medical History:  She has a past medical history of Blood in stool (3/4/2021), Dyslipidemia (1/8/5697), Folic acid deficiency (3/4/2021), 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), Migraines, and Pain of both eyes (3/4/2021)  ,  _______________________________________________________________________  Medical Problems:  does not have any pertinent problems on file ,  _______________________________________________________________________  Past Surgical History:   has no past surgical history on file ,  _______________________________________________________________________  Family History:  family history includes Diabetes in her mother; Hyperlipidemia in her father; Hypertension in her father; Migraines in her mother ,  _______________________________________________________________________  Social History:   reports that she has never smoked  She has never used smokeless tobacco  She reports previous alcohol use  No history on file for drug use ,  _______________________________________________________________________  Allergies:  has No Known Allergies     _______________________________________________________________________  Current Outpatient Medications   Medication Sig Dispense Refill    amitriptyline (ELAVIL) 10 mg tablet Take 1 tablet (10 mg total) by mouth daily at bedtime To prevent headache 30 tablet 3    polyethylene glycol (GLYCOLAX) 17 GM/SCOOP powder Take 17 g by mouth 2 (two) times a day 507 g 2    SUMAtriptan (Imitrex) 50 mg tablet Take 1 tablet (50 mg total) by mouth once as needed for migraine for up to 1 dose Repeat in 2 hours if not improved, no more than 200 mg in 24 hours 12 tablet 3     No current facility-administered medications for this visit      _______________________________________________________________________  Review of Systems   Constitutional: Negative for activity change, appetite change, fatigue and unexpected weight change  HENT: Negative for ear pain, sore throat, trouble swallowing and voice change  Eyes: Negative for photophobia and visual disturbance  Respiratory: Negative for cough, chest tightness, shortness of breath and wheezing  Cardiovascular: Negative for chest pain, palpitations and leg swelling  Gastrointestinal: Negative for abdominal pain, constipation, diarrhea, nausea, rectal pain and vomiting  Endocrine: Negative for cold intolerance, polydipsia and polyuria  Genitourinary: Negative for difficulty urinating, dysuria, flank pain, menstrual problem and pelvic pain  Musculoskeletal: Negative for arthralgias, joint swelling and myalgias  Skin: Negative for color change and rash  Allergic/Immunologic: Negative for environmental allergies and immunocompromised state  Neurological: Positive for headaches  Negative for dizziness, weakness and numbness     Hematological: Negative for adenopathy  Does not bruise/bleed easily  Psychiatric/Behavioral: Negative for decreased concentration, dysphoric mood, self-injury, sleep disturbance and suicidal ideas  The patient is not nervous/anxious  Objective:  Vitals:    01/13/22 1049   BP: 116/80   BP Location: Left arm   Patient Position: Sitting   Cuff Size: Standard   Pulse: 76   Temp: (!) 96 4 °F (35 8 °C)   TempSrc: Tympanic   SpO2: 98%   Weight: 73 kg (161 lb)   Height: 5' 5" (1 651 m)     Body mass index is 26 79 kg/m²  Physical Exam  Vitals and nursing note reviewed  Constitutional:       Appearance: Normal appearance  She is well-developed  HENT:      Head: Normocephalic and atraumatic  Right Ear: Tympanic membrane, ear canal and external ear normal       Left Ear: Tympanic membrane, ear canal and external ear normal       Nose: Nose normal       Mouth/Throat:      Mouth: Mucous membranes are dry  Pharynx: Oropharynx is clear  No oropharyngeal exudate  Eyes:      Pupils: Pupils are equal, round, and reactive to light  Neck:      Thyroid: No thyromegaly  Cardiovascular:      Rate and Rhythm: Normal rate and regular rhythm  Heart sounds: Normal heart sounds  No murmur heard  Pulmonary:      Effort: Pulmonary effort is normal  No respiratory distress  Breath sounds: Normal breath sounds  No wheezing or rales  Abdominal:      General: Bowel sounds are normal  There is no distension  Palpations: Abdomen is soft  Tenderness: There is no abdominal tenderness  There is no guarding  Genitourinary:     Vagina: Normal    Musculoskeletal:         General: No tenderness  Normal range of motion  Cervical back: Normal range of motion and neck supple  Skin:     General: Skin is warm and dry  Capillary Refill: Capillary refill takes less than 2 seconds  Findings: No rash  Neurological:      General: No focal deficit present        Mental Status: She is alert and oriented to person, place, and time  Mental status is at baseline  Psychiatric:         Mood and Affect: Mood normal          Behavior: Behavior normal          Thought Content:  Thought content normal          Judgment: Judgment normal

## 2022-02-01 ENCOUNTER — TELEPHONE (OUTPATIENT)
Dept: NEUROLOGY | Facility: CLINIC | Age: 42
End: 2022-02-01

## 2022-02-01 NOTE — TELEPHONE ENCOUNTER
THE [facility] for patient to [facility] and confirm appointment with Dr Stefany Rhodes direct number in [place]

## 2022-02-03 ENCOUNTER — CONSULT (OUTPATIENT)
Dept: NEUROLOGY | Facility: CLINIC | Age: 42
End: 2022-02-03
Payer: COMMERCIAL

## 2022-02-03 VITALS
DIASTOLIC BLOOD PRESSURE: 64 MMHG | WEIGHT: 160 LBS | SYSTOLIC BLOOD PRESSURE: 114 MMHG | HEART RATE: 72 BPM | BODY MASS INDEX: 26.66 KG/M2 | HEIGHT: 65 IN | TEMPERATURE: 97.4 F | OXYGEN SATURATION: 98 %

## 2022-02-03 DIAGNOSIS — G43.B1 INTRACTABLE OPHTHALMOPLEGIC MIGRAINE: ICD-10-CM

## 2022-02-03 DIAGNOSIS — M54.2 CERVICALGIA: ICD-10-CM

## 2022-02-03 DIAGNOSIS — G43.909 MIGRAINE WITHOUT STATUS MIGRAINOSUS, NOT INTRACTABLE: Primary | ICD-10-CM

## 2022-02-03 PROCEDURE — 99245 OFF/OP CONSLTJ NEW/EST HI 55: CPT | Performed by: PSYCHIATRY & NEUROLOGY

## 2022-02-03 RX ORDER — AMITRIPTYLINE HYDROCHLORIDE 10 MG/1
10 TABLET, FILM COATED ORAL
Qty: 30 TABLET | Refills: 3 | Status: SHIPPED | OUTPATIENT
Start: 2022-02-03 | End: 2022-03-22

## 2022-02-03 RX ORDER — SUMATRIPTAN 100 MG/1
100 TABLET, FILM COATED ORAL ONCE AS NEEDED
Qty: 9 TABLET | Refills: 3 | Status: SHIPPED | OUTPATIENT
Start: 2022-02-03 | End: 2022-05-16

## 2022-02-03 RX ORDER — METHOCARBAMOL 500 MG/1
500 TABLET, FILM COATED ORAL 3 TIMES DAILY
Qty: 90 TABLET | Refills: 3 | Status: SHIPPED | OUTPATIENT
Start: 2022-02-03

## 2022-02-03 NOTE — ASSESSMENT & PLAN NOTE
Balta Baldwin is a 75-year-old female with no pertinent past medical history who presents for evaluation of headaches  She reports a L temporal headache associated with photophobia, phonophobia and L eye redness and irritation  Headache occurring approximately 10-15 times in a month  In the past has tried topiramate with no relief       Plan:  · As a preventative will start Amitriptyline 10 mg to be taken every night at bedtime   · As an abortive option will start imitrex 100 mg as needed, may repeat dose in 2 H if headache is still present, no more than 200 mg in 24 H   · Continue practiving good sleep hygiene  · Drink at least 64 oz of water daily   · Diet rich in fruits and vegetables   · May consider imaging of the brain in the future   · Follow up in 3 months

## 2022-02-03 NOTE — ASSESSMENT & PLAN NOTE
Paulino Longoria is a 78-year-old female with no pertinent past medical history who presents for evaluation of headaches  Patient reports stiffness and pain along her cervical spine which radiates upwards  Patient works as a  and spends a lot of time looking down       Plan:  · Start Robaxin 500 mg tid   · Continue topical salonpas

## 2022-02-03 NOTE — PROGRESS NOTES
Patient ID: Lorin Joya is a 35 y o  female  Assessment/Plan:    Migraine without status migrainosus, not intractable  Lorin Joya is a 53-year-old female with no pertinent past medical history who presents for evaluation of headaches  She reports a L temporal headache associated with photophobia, phonophobia and L eye redness and irritation  Headache occurring approximately 10-15 times in a month  In the past has tried topiramate with no relief  Plan:  · As a preventative will start Amitriptyline 10 mg to be taken every night at bedtime   · As an abortive option will start imitrex 100 mg as needed, may repeat dose in 2 H if headache is still present, no more than 200 mg in 24 H   · Continue practiving good sleep hygiene  · Drink at least 64 oz of water daily   · Diet rich in fruits and vegetables   · May consider imaging of the brain in the future   · Follow up in 3 months       Emanuel Villavicencio is a 53-year-old female with no pertinent past medical history who presents for evaluation of headaches  Patient reports stiffness and pain along her cervical spine which radiates upwards  Patient works as a  and spends a lot of time looking down  Plan:  · Start Robaxin 500 mg tid   · Continue topical salonpas         Diagnoses and all orders for this visit:    Cervicalgia  -     methocarbamol (ROBAXIN) 500 mg tablet; Take 1 tablet (500 mg total) by mouth 3 (three) times a day    Intractable ophthalmoplegic migraine  -     Ambulatory referral to Neurology  -     SUMAtriptan (Imitrex) 100 mg tablet; Take 1 tablet (100 mg total) by mouth once as needed for migraine for up to 9 doses Repeat in 2 hours if not improved, no more than 200 mg in 24 hours  -     amitriptyline (ELAVIL) 10 mg tablet;  Take 1 tablet (10 mg total) by mouth daily at bedtime To prevent headache    Migraine without status migrainosus, not intractable         Subjective:    HPI     Lorin Joya is a 53-year-old female with no pertinent past medical history who presents for evaluation of headaches  Patient's headaches have been ongoing for 6 years but over time have become more increasingly bothersome  She reports having a left temporal headache as well as a cervicogenic headache with upwards radiation  Associated symptoms include sensitivity to the light and sound  She denies any nausea, vomiting, weakness, numbness/tingling or difficulties with ambulation  She reports L eye redness and irritation  She has difficulty describing the quality of her left temporal headache  Her cervicogenic headaches she describes it as stiff     She currently gets her migraines 2-3 days in a week or 10-15 times in a month  She denies any specific triggers  She uses Excedrin migraine for pain relief however has been using it more frequently and in increased doses  Salonpas topical patches have also helped  Patient denies any problems with blood pressure, seizures or mood  She has a maternal history of migraines  PCP recently prescribed Elavil 10 mg q h s  and Imitrex 50 mg however she never picked up the prescription  In the past patient has tried a cervical injection which she does not remember the name and topiramate  None of these options help her headaches  The following portions of the patient's history were reviewed and updated as appropriate: allergies, current medications, past family history, past medical history, past social history, past surgical history and problem list and ros  Objective:    Blood pressure 114/64, pulse 72, temperature (!) 97 4 °F (36 3 °C), temperature source Temporal, height 5' 5" (1 651 m), weight 72 6 kg (160 lb), SpO2 98 %  Physical Exam  Eyes:      General: Lids are normal       Extraocular Movements: Extraocular movements intact  Pupils: Pupils are equal, round, and reactive to light     Neurological:      Deep Tendon Reflexes: Strength normal       Reflex Scores:       Bicep reflexes are 2+ on the right side and 2+ on the left side  Brachioradialis reflexes are 2+ on the right side and 2+ on the left side  Patellar reflexes are 1+ on the right side and 1+ on the left side  Neurological Exam    Cranial Nerves  CN III, IV, VI: Extraocular movements intact bilaterally  Normal lids and orbits bilaterally  Pupils equal round and reactive to light bilaterally  CN VII: Full and symmetric facial movement  CN VIII: Hearing is normal   CN IX, X: Palate elevates symmetrically  Normal gag reflex  CN XII: Tongue midline without atrophy or fasciculations  Mild redness and irritation of the L eye   Motor   Strength is 5/5 throughout all four extremities  No pronator drift   Reflexes                                           Right                      Left  Brachioradialis                    2+                         2+  Biceps                                 2+                         2+  Patellar                                1+                         1+    Gait  Casual gait is normal including stance, stride, and arm swing  ROS:    Review of Systems   Constitutional: Negative  Negative for appetite change and fever  HENT: Negative  Negative for hearing loss, tinnitus, trouble swallowing and voice change  Eyes: Positive for pain and visual disturbance  Negative for photophobia  Respiratory: Negative  Negative for shortness of breath  Cardiovascular: Negative  Negative for palpitations  Gastrointestinal: Negative  Negative for nausea and vomiting  Endocrine: Negative  Negative for cold intolerance  Genitourinary: Negative  Negative for dysuria, frequency and urgency  Musculoskeletal: Positive for neck pain  Negative for myalgias  Skin: Negative  Negative for rash  Neurological: Positive for headaches  Negative for dizziness, tremors, seizures, syncope, facial asymmetry, speech difficulty, weakness, light-headedness and numbness          Patient stated that she has headaches 3 to 4 per week  Hematological: Negative  Does not bruise/bleed easily  Psychiatric/Behavioral: Negative  Negative for confusion, hallucinations and sleep disturbance

## 2022-03-21 ENCOUNTER — TELEPHONE (OUTPATIENT)
Dept: FAMILY MEDICINE CLINIC | Facility: CLINIC | Age: 42
End: 2022-03-21

## 2022-03-21 DIAGNOSIS — G43.B1 INTRACTABLE OPHTHALMOPLEGIC MIGRAINE: ICD-10-CM

## 2022-03-22 RX ORDER — AMITRIPTYLINE HYDROCHLORIDE 10 MG/1
TABLET, FILM COATED ORAL
Qty: 90 TABLET | Refills: 1 | Status: SHIPPED | OUTPATIENT
Start: 2022-03-22 | End: 2022-03-24

## 2022-03-24 DIAGNOSIS — G43.B1 INTRACTABLE OPHTHALMOPLEGIC MIGRAINE: ICD-10-CM

## 2022-03-24 RX ORDER — AMITRIPTYLINE HYDROCHLORIDE 10 MG/1
TABLET, FILM COATED ORAL
Qty: 90 TABLET | Refills: 1 | Status: SHIPPED | OUTPATIENT
Start: 2022-03-24

## 2022-03-24 NOTE — TELEPHONE ENCOUNTER
CALLED AND SPOKE TO JANUSZ AT PHARMACY AND PATIENT IS NOT DUE FOR REFILL ON AMITRIPTYLINE JUST GOT A 90 DAY SUPPLY IN February 2022  Bishop Deluna

## 2022-04-20 ENCOUNTER — TELEPHONE (OUTPATIENT)
Dept: NEUROLOGY | Facility: CLINIC | Age: 42
End: 2022-04-20

## 2022-04-20 NOTE — TELEPHONE ENCOUNTER
THE [facility] for patient to call back to [phone] to schedule her 3 month follow up with Dr Collette Way (openings on 6/7/2022 or 6/14/2022)

## 2022-04-21 NOTE — TELEPHONE ENCOUNTER
Patient called back and scheduled 3 month follow up appointment with Dr Romaine Rubin for 1 pm 6/7/2022  Also made aware appointment with be at [address] [facility],[address] location

## 2022-05-11 ENCOUNTER — OFFICE VISIT (OUTPATIENT)
Dept: FAMILY MEDICINE CLINIC | Facility: CLINIC | Age: 42
End: 2022-05-11
Payer: COMMERCIAL

## 2022-05-11 VITALS
DIASTOLIC BLOOD PRESSURE: 76 MMHG | WEIGHT: 162 LBS | BODY MASS INDEX: 26.99 KG/M2 | TEMPERATURE: 97.3 F | OXYGEN SATURATION: 99 % | SYSTOLIC BLOOD PRESSURE: 102 MMHG | HEIGHT: 65 IN | HEART RATE: 75 BPM

## 2022-05-11 DIAGNOSIS — G43.909 MIGRAINE WITHOUT STATUS MIGRAINOSUS, NOT INTRACTABLE, UNSPECIFIED MIGRAINE TYPE: ICD-10-CM

## 2022-05-11 DIAGNOSIS — K59.00 CONSTIPATION, UNSPECIFIED CONSTIPATION TYPE: ICD-10-CM

## 2022-05-11 DIAGNOSIS — Z91.09 ENVIRONMENTAL ALLERGIES: ICD-10-CM

## 2022-05-11 DIAGNOSIS — Z23 ENCOUNTER FOR IMMUNIZATION: Primary | ICD-10-CM

## 2022-05-11 DIAGNOSIS — Z12.31 ENCOUNTER FOR SCREENING MAMMOGRAM FOR BREAST CANCER: ICD-10-CM

## 2022-05-11 PROBLEM — J45.909 ASTHMA DUE TO ENVIRONMENTAL ALLERGIES: Status: ACTIVE | Noted: 2022-05-11

## 2022-05-11 PROCEDURE — 90471 IMMUNIZATION ADMIN: CPT | Performed by: FAMILY MEDICINE

## 2022-05-11 PROCEDURE — 99215 OFFICE O/P EST HI 40 MIN: CPT | Performed by: FAMILY MEDICINE

## 2022-05-11 PROCEDURE — 90715 TDAP VACCINE 7 YRS/> IM: CPT | Performed by: FAMILY MEDICINE

## 2022-05-11 NOTE — PROGRESS NOTES
Assessment/Plan:  Will notify her neurologist regarding she did not do well on Imitrex  Excedrin migraine does help her headache may be Fioricet will be a better option for her for acute abortive agent  Advised Claritin for allergies  Will wait for blood work results  Tdap vaccine given today  Insurance did not cover for HPV vaccine  She will need mammogram Pap smear  I have spent 40 minutes with Patient  today in which greater than 50% of this time was spent in counseling/coordination of care regarding Prognosis, Risks and benefits of tx options and Intructions for management  Problem List Items Addressed This Visit        Cardiovascular and Mediastinum    Migraine without status migrainosus, not intractable       Other    Constipation    Environmental allergies      Other Visit Diagnoses     Encounter for immunization    -  Primary    Relevant Orders    TDAP VACCINE GREATER THAN OR EQUAL TO 8YO IM (Completed)    Encounter for screening mammogram for breast cancer        Relevant Orders    Mammo screening bilateral w 3d & cad            Subjective:      Patient ID: Pancho Luther is a 35 y o  female  78-year-old female follow-up migraine headaches  Patient follow neurologist was started on Robaxin twice a day and amitriptyline at bedtime  Medication does help her headache and help her to sleep better however Imitrex which she takes for acute migraine headache make her feel very weak numb tingling in her hand and feet and she feels very off very scary for her this happened twice when she takes the medicine so she stopped  She has upcoming appointment with neurologist soon  Her insurance change so she cannot find eye doctor in her network for eye pain  She has a lot allergies due to seasonal   She  start taking MiraLax and smooth move tea her constipation resolved  No family history of colon cancer  She works as a     She is due for mammogram   Her   8 years ago from lung cancer  The following portions of the patient's history were reviewed and updated as appropriate:   Past Medical History:  She has a past medical history of Blood in stool (3/4/2021), Dyslipidemia (6/7/9653), Folic acid deficiency (3/4/2021), 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), Migraines, and Pain of both eyes (3/4/2021)  ,  _______________________________________________________________________  Medical Problems:  does not have any pertinent problems on file ,  _______________________________________________________________________  Past Surgical History:   has no past surgical history on file ,  _______________________________________________________________________  Family History:  family history includes Diabetes in her mother; Hyperlipidemia in her father; Hypertension in her father; Migraines in her mother ,  _______________________________________________________________________  Social History:   reports that she has never smoked  She has never used smokeless tobacco  She reports previous alcohol use  No history on file for drug use ,  _______________________________________________________________________  Allergies:  has No Known Allergies     _______________________________________________________________________  Current Outpatient Medications   Medication Sig Dispense Refill    amitriptyline (ELAVIL) 10 mg tablet TAKE 1 TABLET BY MOUTH EVERY DAY AT BEDTIME FOR HEADACHE PREVENTION 90 tablet 1    methocarbamol (ROBAXIN) 500 mg tablet Take 1 tablet (500 mg total) by mouth 3 (three) times a day 90 tablet 3    polyethylene glycol (GLYCOLAX) 17 GM/SCOOP powder Take 17 g by mouth 2 (two) times a day (Patient not taking: Reported on 2/3/2022 ) 507 g 2    SUMAtriptan (Imitrex) 100 mg tablet Take 1 tablet (100 mg total) by mouth once as needed for migraine for up to 9 doses Repeat in 2 hours if not improved, no more than 200 mg in 24 hours 9 tablet 3     No current facility-administered medications for this visit      _______________________________________________________________________  Review of Systems   Constitutional: Negative for activity change, appetite change, fatigue and unexpected weight change  HENT: Negative for ear pain, sore throat, trouble swallowing and voice change  Eyes: Negative for photophobia and visual disturbance  Respiratory: Negative for cough, chest tightness, shortness of breath and wheezing  Cardiovascular: Negative for chest pain, palpitations and leg swelling  Gastrointestinal: Negative for abdominal pain, constipation, diarrhea, nausea, rectal pain and vomiting  Endocrine: Negative for cold intolerance, polydipsia and polyuria  Genitourinary: Negative for difficulty urinating, dysuria, flank pain, menstrual problem and pelvic pain  Musculoskeletal: Negative for arthralgias, joint swelling and myalgias  Skin: Negative for color change and rash  Allergic/Immunologic: Negative for environmental allergies and immunocompromised state  Neurological: Positive for headaches  Negative for dizziness, weakness and numbness  Hematological: Negative for adenopathy  Does not bruise/bleed easily  Psychiatric/Behavioral: Negative for decreased concentration, dysphoric mood, self-injury, sleep disturbance and suicidal ideas  The patient is not nervous/anxious  Objective:  Vitals:    05/11/22 1018   BP: 102/76   BP Location: Left arm   Patient Position: Sitting   Cuff Size: Standard   Pulse: 75   Temp: (!) 97 3 °F (36 3 °C)   TempSrc: Tympanic   SpO2: 99%   Weight: 73 5 kg (162 lb)   Height: 5' 5" (1 651 m)     Body mass index is 26 96 kg/m²  Physical Exam  Vitals and nursing note reviewed  Constitutional:       Appearance: Normal appearance  She is well-developed  HENT:      Head: Normocephalic and atraumatic        Right Ear: Tympanic membrane, ear canal and external ear normal       Left Ear: Tympanic membrane, ear canal and external ear normal       Nose: Nose normal       Mouth/Throat:      Pharynx: No oropharyngeal exudate  Eyes:      Pupils: Pupils are equal, round, and reactive to light  Neck:      Thyroid: No thyromegaly  Cardiovascular:      Rate and Rhythm: Normal rate and regular rhythm  Heart sounds: Normal heart sounds  No murmur heard  Pulmonary:      Effort: Pulmonary effort is normal  No respiratory distress  Breath sounds: Normal breath sounds  No wheezing or rales  Abdominal:      General: Bowel sounds are normal  There is no distension  Palpations: Abdomen is soft  Tenderness: There is no abdominal tenderness  There is no guarding  Genitourinary:     Vagina: Normal    Musculoskeletal:         General: No tenderness  Normal range of motion  Cervical back: Normal range of motion and neck supple  Skin:     General: Skin is warm and dry  Capillary Refill: Capillary refill takes less than 2 seconds  Findings: No rash  Neurological:      General: No focal deficit present  Mental Status: She is alert and oriented to person, place, and time  Mental status is at baseline  Psychiatric:         Mood and Affect: Mood normal          Behavior: Behavior normal          Thought Content:  Thought content normal          Judgment: Judgment normal

## 2022-05-12 LAB
25(OH)D3 SERPL-MCNC: 29 NG/ML (ref 30–100)
ALBUMIN SERPL-MCNC: 4.4 G/DL (ref 3.6–5.1)
ALBUMIN/GLOB SERPL: 1.6 (CALC) (ref 1–2.5)
ALP SERPL-CCNC: 46 U/L (ref 31–125)
ALT SERPL-CCNC: 14 U/L (ref 6–29)
AST SERPL-CCNC: 17 U/L (ref 10–30)
BASOPHILS # BLD AUTO: 19 CELLS/UL (ref 0–200)
BASOPHILS NFR BLD AUTO: 0.3 %
BILIRUB SERPL-MCNC: 0.3 MG/DL (ref 0.2–1.2)
BUN SERPL-MCNC: 14 MG/DL (ref 7–25)
BUN/CREAT SERPL: NORMAL (CALC) (ref 6–22)
CALCIUM SERPL-MCNC: 9.8 MG/DL (ref 8.6–10.2)
CHLORIDE SERPL-SCNC: 105 MMOL/L (ref 98–110)
CHOLEST SERPL-MCNC: 238 MG/DL
CHOLEST/HDLC SERPL: 4.3 (CALC)
CO2 SERPL-SCNC: 26 MMOL/L (ref 20–32)
CREAT SERPL-MCNC: 0.58 MG/DL (ref 0.5–1.1)
EOSINOPHIL # BLD AUTO: 88 CELLS/UL (ref 15–500)
EOSINOPHIL NFR BLD AUTO: 1.4 %
ERYTHROCYTE [DISTWIDTH] IN BLOOD BY AUTOMATED COUNT: 12.9 % (ref 11–15)
GLOBULIN SER CALC-MCNC: 2.7 G/DL (CALC) (ref 1.9–3.7)
GLUCOSE SERPL-MCNC: 95 MG/DL (ref 65–99)
HCT VFR BLD AUTO: 40.3 % (ref 35–45)
HDLC SERPL-MCNC: 55 MG/DL
HGB BLD-MCNC: 13.2 G/DL (ref 11.7–15.5)
LDLC SERPL CALC-MCNC: 160 MG/DL (CALC)
LYMPHOCYTES # BLD AUTO: 1953 CELLS/UL (ref 850–3900)
LYMPHOCYTES NFR BLD AUTO: 31 %
MCH RBC QN AUTO: 28.9 PG (ref 27–33)
MCHC RBC AUTO-ENTMCNC: 32.8 G/DL (ref 32–36)
MCV RBC AUTO: 88.4 FL (ref 80–100)
MONOCYTES # BLD AUTO: 353 CELLS/UL (ref 200–950)
MONOCYTES NFR BLD AUTO: 5.6 %
NEUTROPHILS # BLD AUTO: 3887 CELLS/UL (ref 1500–7800)
NEUTROPHILS NFR BLD AUTO: 61.7 %
NONHDLC SERPL-MCNC: 183 MG/DL (CALC)
PLATELET # BLD AUTO: 372 THOUSAND/UL (ref 140–400)
PMV BLD REES-ECKER: 9.9 FL (ref 7.5–12.5)
POTASSIUM SERPL-SCNC: 4.1 MMOL/L (ref 3.5–5.3)
PROT SERPL-MCNC: 7.1 G/DL (ref 6.1–8.1)
RBC # BLD AUTO: 4.56 MILLION/UL (ref 3.8–5.1)
SL AMB EGFR AFRICAN AMERICAN: 131 ML/MIN/1.73M2
SL AMB EGFR NON AFRICAN AMERICAN: 113 ML/MIN/1.73M2
SODIUM SERPL-SCNC: 139 MMOL/L (ref 135–146)
TRIGL SERPL-MCNC: 112 MG/DL
VIT B1 SERPL-SCNC: 13 NMOL/L (ref 8–30)
VIT B12 SERPL-MCNC: 1038 PG/ML (ref 200–1100)
VIT B6 SERPL-MCNC: 45.6 NG/ML (ref 2.1–21.7)
WBC # BLD AUTO: 6.3 THOUSAND/UL (ref 3.8–10.8)

## 2022-05-16 DIAGNOSIS — G43.909 MIGRAINE WITHOUT STATUS MIGRAINOSUS, NOT INTRACTABLE, UNSPECIFIED MIGRAINE TYPE: Primary | ICD-10-CM

## 2022-05-16 RX ORDER — RIZATRIPTAN BENZOATE 5 MG/1
5 TABLET, ORALLY DISINTEGRATING ORAL ONCE AS NEEDED
Qty: 9 TABLET | Refills: 0 | Status: SHIPPED | OUTPATIENT
Start: 2022-05-16

## 2022-06-02 ENCOUNTER — TELEPHONE (OUTPATIENT)
Dept: NEUROLOGY | Facility: CLINIC | Age: 42
End: 2022-06-02

## 2022-06-02 NOTE — TELEPHONE ENCOUNTER
THE [facility] to confirm patient's 6/7/2022 @ 1 pm appointment with Dr Naseem Rick and to provide new office address for that appointment of [address] [place] [address],[address], [place]  Call back number given [phone]

## 2022-06-07 ENCOUNTER — OFFICE VISIT (OUTPATIENT)
Dept: NEUROLOGY | Facility: CLINIC | Age: 42
End: 2022-06-07
Payer: COMMERCIAL

## 2022-06-07 VITALS
BODY MASS INDEX: 26.49 KG/M2 | DIASTOLIC BLOOD PRESSURE: 86 MMHG | SYSTOLIC BLOOD PRESSURE: 110 MMHG | HEART RATE: 67 BPM | WEIGHT: 159 LBS | TEMPERATURE: 97.1 F | HEIGHT: 65 IN

## 2022-06-07 DIAGNOSIS — G43.909 MIGRAINE WITHOUT STATUS MIGRAINOSUS, NOT INTRACTABLE, UNSPECIFIED MIGRAINE TYPE: Primary | ICD-10-CM

## 2022-06-07 PROCEDURE — 99214 OFFICE O/P EST MOD 30 MIN: CPT | Performed by: PSYCHIATRY & NEUROLOGY

## 2022-06-07 RX ORDER — PROCHLORPERAZINE MALEATE 10 MG
10 TABLET ORAL EVERY 8 HOURS PRN
Qty: 30 TABLET | Refills: 0 | Status: SHIPPED | OUTPATIENT
Start: 2022-06-07

## 2022-06-07 NOTE — ASSESSMENT & PLAN NOTE
Anaya Michelle is a 43-year-old female with no pertinent past medical history who presents for headache follow up  She reports a L temporal headache associated with photophobia, phonophobia and L eye redness and irritation  Headache occurring approximately 10-15 times in a month  Preventative:  Topiramate:  No relief    Abortive:  Sumatriptan 50 mg:  No relief  Sumatriptan 100 mg:  Did not tolerate  Numbness, tingling extremities and feeling off    Patient not taking elavil as directed therefore difficult to assess effectiveness  Did not tolerate sumatriptan and did not  prescription for Maxalt       Plan:  · Prescription in the pharmacy for rizatriptan 5 mg available from PCP who prescribed on 05/16  Patient advised to call the pharmacy and pick it up  She is to take this medication as soon as she gets migraine warning sign     · Reinforced taking amitriptyline 10 mg every night regardless of sleepiness or headache, needs daily compliance to assess if effective for migraine prevention  · Continue methacarbamol as needed for neck pain up to 3 times a day  · Prescription sent for prochlorperazine 10 mg for nausea and headache   · Given wrtitten detailed instructions   · Continue practicing good sleep hygiene  · Drink at least 64 oz of water daily   · Diet rich in fruits and vegetables   · May consider imaging of the brain in the future   · Will communicate with PCP   · Follow up in 3 months

## 2022-06-07 NOTE — PATIENT INSTRUCTIONS
Prescription in the pharmacy for rizatriptan 5 mg, call and pick it up, take as needed for migraine as soon as you get eye pain   Take amitriptyline 10 mg every night regardless of sleepiness or headache   Continue methacarbamol as needed for neck pain up to 3 times a day  NEW Prescription sent for prochlorperazine 10 mg for nausea and headache

## 2022-06-07 NOTE — PROGRESS NOTES
Patient ID: Octavio Michaels is a 35 y o  female  Assessment/Plan:    Migraine without status migrainosus, not intractable  Octavio Michaels is a 24-year-old female with no pertinent past medical history who presents for headache follow up  She reports a L temporal headache associated with photophobia, phonophobia and L eye redness and irritation  Headache occurring approximately 10-15 times in a month  Preventative:  Topiramate:  No relief    Abortive:  Sumatriptan 50 mg:  No relief  Sumatriptan 100 mg:  Did not tolerate  Numbness, tingling extremities and feeling off    Patient not taking elavil as directed therefore difficult to assess effectiveness  Did not tolerate sumatriptan and did not  prescription for Maxalt       Plan:  · Prescription in the pharmacy for rizatriptan 5 mg available from PCP who prescribed on 05/16  Patient advised to call the pharmacy and pick it up  She is to take this medication as soon as she gets migraine warning sign  · Reinforced taking amitriptyline 10 mg every night regardless of sleepiness or headache, needs daily compliance to assess if effective for migraine prevention  · Continue methacarbamol as needed for neck pain up to 3 times a day  · Prescription sent for prochlorperazine 10 mg for nausea and headache   · Given wrtitten detailed instructions   · Continue practicing good sleep hygiene  · Drink at least 64 oz of water daily   · Diet rich in fruits and vegetables   · May consider imaging of the brain in the future   · Will communicate with PCP   · Follow up in 3 months          Diagnoses and all orders for this visit:    Migraine without status migrainosus, not intractable, unspecified migraine type  -     prochlorperazine (COMPAZINE) 10 mg tablet;  Take 1 tablet (10 mg total) by mouth every 8 (eight) hours as needed for nausea or vomiting         Subjective:    HPI    Octavio Michaels is a 24-year-old female with no pertinent past medical history who presents for headache follow up  History gathered by [employer]  She reports a L temporal headache associated with photophobia, phonophobia and L eye redness and irritation  Last visit started on 10 mg elavil qhs for ppx, imitrex 100 mg prn for abortive  Did not tolerate imitrex with numbness/tingling of hands and feet and a feeling of being off  PCP reached out to me and I advised Maxalt 5 mg which was ordered by PCP  It seems like patient has not been taking her medications as directed  She has been taking Elavil 10 mg mostly p r n  when she can not sleep, not every night as directed  She did not  the prescription for Maxalt from the pharmacy and has not tried it  She continues to take Excedrin migraine but has required increased dosing  She reports taking Robaxin 3 times a day however unclear if this is helping her cervicalgia  She states that her headaches have been occurring more frequently and more severe  For example, this past week she had a headache every day  She states she gets nausea and dizziness if she does not take her medication right away  She has been trying to get an appointment with eye doctor however has been having difficulties with insurance approval       The following portions of the patient's history were reviewed and updated as appropriate: allergies, current medications, past family history, past medical history, past social history, past surgical history and problem list and review of systems  Objective:    Blood pressure 110/86, pulse 67, temperature (!) 97 1 °F (36 2 °C), height 5' 5" (1 651 m), weight 72 1 kg (159 lb)  Physical Exam  Constitutional:       General: She is awake  Eyes:      General: Lids are normal       Extraocular Movements: Extraocular movements intact  Pupils: Pupils are equal, round, and reactive to light  Neurological:      Mental Status: She is alert        Deep Tendon Reflexes: Strength normal       Reflex Scores:       Bicep reflexes are 2+ on the right side and 2+ on the left side  Brachioradialis reflexes are 2+ on the right side and 2+ on the left side  Patellar reflexes are 2+ on the right side and 2+ on the left side  Neurological Exam  Mental Status  Awake and alert  Cranial Nerves  CN III, IV, VI: Extraocular movements intact bilaterally  Normal lids and orbits bilaterally  Pupils equal round and reactive to light bilaterally  CN VII: Full and symmetric facial movement  CN VIII: Hearing is normal   Mild redness and irritation of the L eye   Motor   Strength is 5/5 throughout all four extremities  Reflexes                                            Right                      Left  Brachioradialis                    2+                         2+  Biceps                                 2+                         2+  Patellar                                2+                         2+    Gait  Casual gait is normal including stance, stride, and arm swing  ROS:    Review of Systems   Constitutional: Negative  Negative for appetite change and fever  HENT: Negative  Negative for hearing loss, tinnitus, trouble swallowing and voice change  Eyes: Positive for photophobia and pain  Negative for visual disturbance  Respiratory: Negative  Negative for shortness of breath  Cardiovascular: Negative  Negative for palpitations  Gastrointestinal: Positive for nausea  Negative for vomiting  Endocrine: Negative  Negative for cold intolerance  Genitourinary: Negative  Negative for dysuria, frequency and urgency  Musculoskeletal: Negative  Negative for myalgias and neck pain  Skin: Negative  Negative for rash  Neurological: Positive for headaches  Negative for dizziness, tremors, seizures, syncope, facial asymmetry, speech difficulty, weakness, light-headedness and numbness  Has HX now, in her neck has had it all week   Hematological: Negative  Does not bruise/bleed easily  Psychiatric/Behavioral: Negative  Negative for confusion, hallucinations and sleep disturbance  All other systems reviewed and are negative

## 2023-02-02 ENCOUNTER — OFFICE VISIT (OUTPATIENT)
Dept: FAMILY MEDICINE CLINIC | Facility: CLINIC | Age: 43
End: 2023-02-02

## 2023-02-02 VITALS
HEART RATE: 74 BPM | DIASTOLIC BLOOD PRESSURE: 70 MMHG | SYSTOLIC BLOOD PRESSURE: 110 MMHG | WEIGHT: 165.2 LBS | HEIGHT: 65 IN | RESPIRATION RATE: 16 BRPM | OXYGEN SATURATION: 98 % | TEMPERATURE: 97.6 F | BODY MASS INDEX: 27.52 KG/M2

## 2023-02-02 DIAGNOSIS — Z12.31 VISIT FOR SCREENING MAMMOGRAM: ICD-10-CM

## 2023-02-02 DIAGNOSIS — Z23 ENCOUNTER FOR IMMUNIZATION: ICD-10-CM

## 2023-02-02 DIAGNOSIS — G43.909 MIGRAINE WITHOUT STATUS MIGRAINOSUS, NOT INTRACTABLE, UNSPECIFIED MIGRAINE TYPE: Primary | ICD-10-CM

## 2023-02-02 DIAGNOSIS — Z12.4 CERVICAL CANCER SCREENING: ICD-10-CM

## 2023-02-02 DIAGNOSIS — E78.5 DYSLIPIDEMIA: ICD-10-CM

## 2023-02-02 DIAGNOSIS — E53.8 CYANOCOBALAMIN DEFICIENCY: ICD-10-CM

## 2023-02-02 DIAGNOSIS — E51.9 THIAMINE DEFICIENCY: ICD-10-CM

## 2023-02-02 DIAGNOSIS — E53.1 PYRIDOXINE DEFICIENCY: ICD-10-CM

## 2023-02-02 DIAGNOSIS — K92.1 BLOOD IN STOOL: ICD-10-CM

## 2023-02-02 DIAGNOSIS — Z11.59 NEED FOR HEPATITIS C SCREENING TEST: ICD-10-CM

## 2023-02-02 DIAGNOSIS — E55.9 VITAMIN D DEFICIENCY: ICD-10-CM

## 2023-02-02 RX ORDER — RIZATRIPTAN BENZOATE 10 MG/1
10 TABLET, ORALLY DISINTEGRATING ORAL ONCE AS NEEDED
Qty: 30 TABLET | Refills: 2 | Status: SHIPPED | OUTPATIENT
Start: 2023-02-02

## 2023-02-02 RX ORDER — GABAPENTIN 100 MG/1
100 CAPSULE ORAL 3 TIMES DAILY
Qty: 90 CAPSULE | Refills: 3 | Status: SHIPPED | OUTPATIENT
Start: 2023-02-02

## 2023-02-02 NOTE — PROGRESS NOTES
Assessment/Plan:  Will notify her neurologist regarding she stopped responding to [address]  Increase dose to 10 mg maxalt   Excedrin migraine does help her headache may be Fioricet will be a better option for her for acute abortive agent  Advised Claritin for allergies  Will wait for blood work results    Insurance did not cover for HPV vaccine  She will need mammogram Pap smear  Refer to gi for blood in stool  Refer to gyn for pap  Stop elavil since it didn't help w preventive headaches and made her more sleepy during the day at the lowest dose  Add on gabapentin to help at night daily for preventive, then for acute headache she can take up to 3x  A day  Needs opthal  Need to fu w neuro  Pt was offer OMT research trial w [facility]=information given  Flu vaccine given today    I have spent 40 minutes with Patient  today in which greater than 50% of this time was spent in counseling/coordination of care regarding Prognosis, Risks and benefits of tx options and Intructions for management             Problem List Items Addressed This Visit        Cardiovascular and Mediastinum    Migraine without status migrainosus, not intractable - Primary    Relevant Medications    rizatriptan (Maxalt-MLT) 10 mg disintegrating tablet    gabapentin (Neurontin) 100 mg capsule       Other    Blood in stool    Relevant Orders    Ambulatory Referral to Gastroenterology    UA w Reflex to Microscopic w Reflex to Culture    Uric acid    Dyslipidemia    Relevant Orders    Comprehensive metabolic panel    Hemoglobin A1C    Lipid Panel with Direct LDL reflex    TSH, 3rd generation with Free T4 reflex    Cyanocobalamin deficiency    Relevant Orders    CBC and differential    Vitamin B12    Vitamin D deficiency    Relevant Orders    Vitamin D 25 hydroxy   Other Visit Diagnoses     Cervical cancer screening        Relevant Orders    Ambulatory Referral to Gynecology    Visit for screening mammogram        Relevant Orders    Mammo screening bilateral w cad    Thiamine deficiency        Relevant Orders    Vitamin B1 (Thiamine), Serum/Plasma, LC/MS/MS    Pyridoxine deficiency        Relevant Orders    Vitamin B6    Need for hepatitis C screening test        Relevant Orders    Hepatitis C Antibody ([facility])    Encounter for immunization        Relevant Orders    influenza vaccine, quadrivalent, recombinant, PF, 0 5 mL, for patients 18 yr+ (FLUBLOK) (Completed)            Subjective:      Patient ID: Ne Davison is a 38 y o  female  61-year-old female follow-up migraine headaches  Patient follow neurologist was started on Robaxin twice a day and amitriptyline at bedtime  Medication does help her headache and help her to sleep better  But made her tired during the day  She works as      however Imitrex which she takes for acute migraine headache make her feel very weak numb tingling in her hand and feet and she feels very off very scary for her this happened twice when she takes the medicine so she stopped  She tried topamax which made her tired, didn't help as preventive med  She saw neuro was given maxalt 5 mg along with compazine for nausea  Med seems to work first 2 months then stopped working despite taking higher dose of maxalt  Now she takes exedrine migraine along w that and still no help  She also had botox injection in the past and didn't help w headache  Her insurance change so she cannot find eye doctor in her network for eye pain  She often gets migraine with eye pain and blurry visiton  She has a lot allergies due to seasonal   She  start taking MiraLax and smooth move tea her constipation resolved but if she stopped taking miralax then blood in stool persist   No family history of colon cancer  She works as a   She is due for mammogram   Her   8 years ago from lung cancer    Last pap 2017 normal  Last lipid show   Had MRI brain 7 y ago was normal      The following portions of the patient's history were reviewed and updated as appropriate:   Past Medical History:  She has a past medical history of Blood in stool (3/4/2021), Dyslipidemia (1/9/3490), Folic acid deficiency (3/4/2021), 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), Migraines, and Pain of both eyes (3/4/2021)  ,  _______________________________________________________________________  Medical Problems:  does not have any pertinent problems on file ,  _______________________________________________________________________  Past Surgical History:   has no past surgical history on file ,  _______________________________________________________________________  Family History:  family history includes Diabetes in her mother; Hyperlipidemia in her father; Hypertension in her father; Migraines in her mother ,  _______________________________________________________________________  Social History:   reports that she has never smoked  She has never used smokeless tobacco  She reports that she does not currently use alcohol  No history on file for drug use ,  _______________________________________________________________________  Allergies:  has No Known Allergies     _______________________________________________________________________  Current Outpatient Medications   Medication Sig Dispense Refill   • gabapentin (Neurontin) 100 mg capsule Take 1 capsule (100 mg total) by mouth 3 (three) times a day 90 capsule 3   • rizatriptan (Maxalt-MLT) 10 mg disintegrating tablet Take 1 tablet (10 mg total) by mouth once as needed for migraine for up to 1 dose May repeat in 2 hours if needed 30 tablet 2   • polyethylene glycol (GLYCOLAX) 17 GM/SCOOP powder Take 17 g by mouth 2 (two) times a day 507 g 2   • prochlorperazine (COMPAZINE) 10 mg tablet Take 1 tablet (10 mg total) by mouth every 8 (eight) hours as needed for nausea or vomiting 30 tablet 0     No current facility-administered medications for this visit  _______________________________________________________________________  Review of Systems   Constitutional: Negative for activity change, appetite change, fatigue and unexpected weight change  HENT: Negative for ear pain, sore throat, trouble swallowing and voice change  Eyes: Positive for visual disturbance  Negative for photophobia  Respiratory: Negative for cough, chest tightness, shortness of breath and wheezing  Cardiovascular: Negative for chest pain, palpitations and leg swelling  Gastrointestinal: Negative for abdominal pain, constipation, diarrhea, nausea, rectal pain and vomiting  Endocrine: Negative for cold intolerance, polydipsia and polyuria  Genitourinary: Negative for difficulty urinating, dysuria, flank pain, menstrual problem and pelvic pain  Musculoskeletal: Negative for arthralgias, joint swelling and myalgias  Skin: Negative for color change and rash  Allergic/Immunologic: Negative for environmental allergies and immunocompromised state  Neurological: Positive for headaches  Negative for dizziness, weakness and numbness  Hematological: Negative for adenopathy  Does not bruise/bleed easily  Psychiatric/Behavioral: Negative for decreased concentration, dysphoric mood, self-injury, sleep disturbance and suicidal ideas  The patient is not nervous/anxious  Objective:  Vitals:    02/02/23 0913   BP: 110/70   BP Location: Left arm   Patient Position: Sitting   Cuff Size: Standard   Pulse: 74   Resp: 16   Temp: 97 6 °F (36 4 °C)   TempSrc: Temporal   SpO2: 98%   Weight: 74 9 kg (165 lb 3 2 oz)   Height: 5' 5" (1 651 m)     Body mass index is 27 49 kg/m²  Physical Exam  Vitals and nursing note reviewed  Constitutional:       Appearance: Normal appearance  She is well-developed  HENT:      Head: Normocephalic and atraumatic        Right Ear: Tympanic membrane, ear canal and external ear normal       Left Ear: Tympanic membrane, ear canal and external ear normal       Nose: Nose normal       Mouth/Throat:      Pharynx: No oropharyngeal exudate  Eyes:      Pupils: Pupils are equal, round, and reactive to light  Neck:      Thyroid: No thyromegaly  Cardiovascular:      Rate and Rhythm: Normal rate and regular rhythm  Heart sounds: Normal heart sounds  No murmur heard  Pulmonary:      Effort: Pulmonary effort is normal  No respiratory distress  Breath sounds: Normal breath sounds  No wheezing or rales  Abdominal:      General: Bowel sounds are normal  There is no distension  Palpations: Abdomen is soft  Tenderness: There is no abdominal tenderness  There is no guarding  Genitourinary:     Vagina: Normal    Musculoskeletal:         General: No tenderness  Normal range of motion  Cervical back: Normal range of motion and neck supple  Skin:     General: Skin is warm and dry  Capillary Refill: Capillary refill takes less than 2 seconds  Findings: No rash  Neurological:      General: No focal deficit present  Mental Status: She is alert and oriented to person, place, and time  Mental status is at baseline  Psychiatric:         Mood and Affect: Mood normal          Behavior: Behavior normal          Thought Content:  Thought content normal          Judgment: Judgment normal

## 2023-02-03 NOTE — PROGRESS NOTES
I called contact number, however, asked to call back later as the person answering did not understand me

## 2023-02-22 ENCOUNTER — PROCEDURE VISIT (OUTPATIENT)
Dept: FAMILY MEDICINE CLINIC | Facility: CLINIC | Age: 43
End: 2023-02-22

## 2023-02-22 DIAGNOSIS — G43.909 MIGRAINE WITHOUT STATUS MIGRAINOSUS, NOT INTRACTABLE, UNSPECIFIED MIGRAINE TYPE: Primary | ICD-10-CM

## 2023-02-22 DIAGNOSIS — M99.01 SOMATIC DYSFUNCTION OF CERVICAL REGION: ICD-10-CM

## 2023-02-22 DIAGNOSIS — M99.00 SOMATIC DYSFUNCTION OF HEAD REGION: ICD-10-CM

## 2023-02-22 NOTE — PROGRESS NOTES
Assessment/Plan     This is a 38 y o  female who presents for OMT visit for:  1  Migraine Headaches  2  Somatic dysfunction of Head Region  3  Somatic dysfunction of cervical region  4  Somatic dysfunction of  region    Plan:   1  Patient tolerated OMT well for the above problems,  advised patient to drink fluids and can use NSAID for soreness after treatment     2  The patient is enrolled in the OMT Migraine Study, this is study visit # 1  Consent has been signed  Next OMT Follow up in in 2 weeks  3  initial MIDAS Score 6, HIT 6 Score 68, Headache Days in last 3 months 30, pain score: 7    Subjective     Mary Ann Dallas is a 38 y o  female and is here for a OMT follow up for Migraine headaches  The patient reports daily headaches that begin either over her left frontal and temporal region or at the base of her neck and travel to the top of her frontal lobe  She has associated Photophobia and N/V  Denies sensitivity to sounds  Migraines usually last all day and up to 1 week at a time  She uses Excedrin daily, experiencing minimal relief  Uses Rizatriptan 1-2x per week, with minimal relief  She has tried neck injections in past that has not worked  Tried motrin and naproxen in past but haven't recently  Review of Systems  Neuro: positive migraines  MSK: as noted in HPI  Objective     OMT Exam   OMT Exam  Head:   Somatic Dysfunction:  Tissue Texture Changes  Severity :  2  Osteopathic Findings: Decreased CRI  Fascial restriction of forehead  Treatment Method: MFR and cranial CV4 and OA release   Response: improved  Cervical:   Somatic Dysfunction:  Tissue Texture Changes, Restriction, and Tenderness  Severity :  2  Osteopathic Findings: Tender points were identified and treated as needed with CS  Paraspinal muscle hypertonicity   Restricted ROM of cervical spine  Treatment Method: ME, ST, and CS  Response: improved  Thoracic T1-4:   Somatic Dysfunction:  Tissue Texture Changes  Severity : 2  Osteopathic Findings: Hypertonicity of mid trapezius muscle   Treatment Method: ME and ST  Response: improved

## 2023-07-06 ENCOUNTER — TELEPHONE (OUTPATIENT)
Dept: FAMILY MEDICINE CLINIC | Facility: CLINIC | Age: 43
End: 2023-07-06